# Patient Record
Sex: FEMALE | Race: WHITE | NOT HISPANIC OR LATINO | Employment: FULL TIME | URBAN - METROPOLITAN AREA
[De-identification: names, ages, dates, MRNs, and addresses within clinical notes are randomized per-mention and may not be internally consistent; named-entity substitution may affect disease eponyms.]

---

## 2020-12-22 ENCOUNTER — OFFICE VISIT (OUTPATIENT)
Dept: URGENT CARE | Facility: CLINIC | Age: 25
End: 2020-12-22
Payer: COMMERCIAL

## 2020-12-22 ENCOUNTER — APPOINTMENT (OUTPATIENT)
Dept: RADIOLOGY | Facility: IMAGING CENTER | Age: 25
End: 2020-12-22
Attending: PHYSICIAN ASSISTANT
Payer: COMMERCIAL

## 2020-12-22 VITALS
HEART RATE: 69 BPM | RESPIRATION RATE: 16 BRPM | OXYGEN SATURATION: 97 % | BODY MASS INDEX: 22.49 KG/M2 | SYSTOLIC BLOOD PRESSURE: 110 MMHG | HEIGHT: 65 IN | WEIGHT: 135 LBS | TEMPERATURE: 98.6 F | DIASTOLIC BLOOD PRESSURE: 86 MMHG
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
## 2020-12-22 PROCEDURE — 72220 X-RAY EXAM SACRUM TAILBONE: CPT | Mod: TC,FY | Performed by: PHYSICIAN ASSISTANT

## 2020-12-22 PROCEDURE — 99203 OFFICE O/P NEW LOW 30 MIN: CPT | Performed by: PHYSICIAN ASSISTANT

## 2020-12-22 RX ORDER — FLUCONAZOLE 150 MG/1
TABLET ORAL
COMMUNITY
Start: 2020-12-16

## 2020-12-22 RX ORDER — OMEGA-3S/DHA/EPA/FISH OIL/D3 300MG-1000
400 CAPSULE ORAL DAILY
COMMUNITY

## 2020-12-22 RX ORDER — PRAZOSIN HYDROCHLORIDE 1 MG/1
CAPSULE ORAL
COMMUNITY
Start: 2020-12-08

## 2020-12-22 RX ORDER — KETOROLAC TROMETHAMINE 30 MG/ML
30 INJECTION, SOLUTION INTRAMUSCULAR; INTRAVENOUS ONCE
Status: COMPLETED | OUTPATIENT
Start: 2020-12-22 | End: 2020-12-22

## 2020-12-22 RX ORDER — SPIRONOLACTONE 25 MG/1
25 TABLET ORAL DAILY
COMMUNITY
Start: 2020-06-26

## 2020-12-22 RX ORDER — CLINDAMYCIN PHOSPHATE 20 MG/G
CREAM VAGINAL
COMMUNITY
Start: 2020-12-16

## 2020-12-22 RX ORDER — DEXTROAMPHETAMINE SACCHARATE, AMPHETAMINE ASPARTATE, DEXTROAMPHETAMINE SULFATE AND AMPHETAMINE SULFATE 5; 5; 5; 5 MG/1; MG/1; MG/1; MG/1
TABLET ORAL
COMMUNITY
Start: 2020-11-23

## 2020-12-22 RX ORDER — PROPRANOLOL HYDROCHLORIDE 10 MG/1
TABLET ORAL
COMMUNITY
Start: 2020-12-15

## 2020-12-22 RX ORDER — OMEPRAZOLE 20 MG/1
CAPSULE, DELAYED RELEASE ORAL
COMMUNITY
Start: 2020-12-14

## 2020-12-22 RX ORDER — DROSPIRENONE 4 MG/1
TABLET, FILM COATED ORAL
COMMUNITY
Start: 2020-11-13

## 2020-12-22 RX ORDER — OMEPRAZOLE 20 MG/1
20 CAPSULE, DELAYED RELEASE ORAL DAILY
COMMUNITY
Start: 2020-06-29

## 2020-12-22 RX ORDER — METHYLPREDNISOLONE 4 MG/1
TABLET ORAL
Qty: 21 TAB | Refills: 0 | Status: SHIPPED | OUTPATIENT
Start: 2020-12-22

## 2020-12-22 RX ORDER — TRAZODONE HYDROCHLORIDE 50 MG/1
20 TABLET ORAL
COMMUNITY

## 2020-12-22 RX ORDER — SPIRONOLACTONE 25 MG/1
TABLET ORAL
COMMUNITY
Start: 2020-12-14

## 2020-12-22 RX ADMIN — KETOROLAC TROMETHAMINE 30 MG: 30 INJECTION, SOLUTION INTRAMUSCULAR; INTRAVENOUS at 14:40

## 2020-12-22 SDOH — HEALTH STABILITY: MENTAL HEALTH: HOW OFTEN DO YOU HAVE A DRINK CONTAINING ALCOHOL?: NEVER

## 2020-12-22 ASSESSMENT — ENCOUNTER SYMPTOMS
CHILLS: 0
VOMITING: 0
MYALGIAS: 1
FEVER: 0
ROS SKIN COMMENTS: NO BRUISING
HEADACHES: 0
DIZZINESS: 0
FLANK PAIN: 0
ABDOMINAL PAIN: 0
NAUSEA: 0
DIAPHORESIS: 0
FALLS: 1
BACK PAIN: 1
TINGLING: 0
DIARRHEA: 0
BLOOD IN STOOL: 0
CONSTIPATION: 0

## 2020-12-22 ASSESSMENT — PAIN SCALES - GENERAL: PAINLEVEL: 4=SLIGHT-MODERATE PAIN

## 2020-12-22 NOTE — PROGRESS NOTES
Subjective:   Hien Naqvi is a 25 y.o. female who presents for Buttocks Pain (x last night, coccyx pain )      HPI:  This is a very pleasant 25-year-old female presenting to the clinic with low back and coccyx pain x1 day.  Patient was walking down the stairs carrying a bunch of stuff when she slipped landing on her tailbone.  She noticed immediate pain.  Her pain is described as a sharp pain with occasional radiating pain up her lumbar spine.  Denies any radicular pain down the legs.  Denies any numbness or tingling to lower extremities.  She has been having normal bowel and bladder function.  Denies any saddle paresthesias.  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  She has been using Tylenol with minimal relief.  Pain is made worse with any palpation, sitting or bending.    Review of Systems   Constitutional: Negative for chills, diaphoresis, fever and malaise/fatigue.   Gastrointestinal: Negative for abdominal pain, blood in stool, constipation, diarrhea, nausea and vomiting.   Genitourinary: Negative for dysuria, flank pain, frequency, hematuria and urgency.   Musculoskeletal: Positive for back pain, falls and myalgias.   Skin: Negative for rash.        No bruising    Neurological: Negative for dizziness, tingling and headaches.       Medications:    • amphetamine-dextroamphetamine Tabs  • clindamycin  • fluconazole  • Shannon-Rul Psyllium Seed Husks Caps  • omeprazole  • prazosin Caps  • propranolol Tabs  • Slynd Tabs  • spironolactone Tabs  • terconazole Crea  • traZODone Tabs  • vitamin D3 (cholecalciferol) Tabs    Allergies: Loratadine and Pineapple    Problem List: Hien Naqvi does not have a problem list on file.    Surgical History:  No past surgical history on file.    Past Social Hx: Hien Naqvi  reports that she has never smoked. She has never used smokeless tobacco. She reports previous alcohol use.     Past Family Hx:  Hien Naqvi family history is not on file.  "    Problem list, medications, and allergies reviewed by myself today in Epic.     Objective:     /86 (BP Location: Left arm, Patient Position: Standing, BP Cuff Size: Adult)   Pulse 69   Temp 37 °C (98.6 °F) (Temporal)   Resp 16   Ht 1.651 m (5' 5\")   Wt 61.2 kg (135 lb)   SpO2 97%   BMI 22.47 kg/m²     Physical Exam  Constitutional:       General: She is not in acute distress.     Appearance: Normal appearance. She is not ill-appearing, toxic-appearing or diaphoretic.   HENT:      Head: Normocephalic and atraumatic.   Eyes:      Conjunctiva/sclera: Conjunctivae normal.   Neck:      Musculoskeletal: Normal range of motion. No neck rigidity.   Cardiovascular:      Rate and Rhythm: Normal rate and regular rhythm.      Pulses: Normal pulses.      Heart sounds: Normal heart sounds.   Pulmonary:      Effort: Pulmonary effort is normal.      Breath sounds: Normal breath sounds. No wheezing.   Musculoskeletal:      Comments: Lumbar exam:  Tenderness to palpation over the L5/S1 spine midline.  Bilateral tenderness to palpation over the lumbar paraspinal muscles and gluteal muscles.  Patient has full range of motion of the bilateral lower extremities.  Strength of bilateral lower extremities 5/5.  Sensation intact.   Lymphadenopathy:      Cervical: No cervical adenopathy.   Skin:     Capillary Refill: Capillary refill takes less than 2 seconds.   Neurological:      General: No focal deficit present.      Mental Status: She is alert. Mental status is at baseline.       DX sacrum and coccyx:  FINDINGS:     There is anterior angulation of the coccyx at the sacrococcygeal junction with lucent appearance of the posterior aspect of this junction. This may represent old fracture deformity or congenital angulation.  No well-defined linear lucency is identified to suggest acute fracture on these plain films.  No blastic or lytic lesions.  The sacral neural arches are intact.     IMPRESSION:     1.  Possible old fracture " or developmental abnormality of the sacrococcygeal junction as described above.     2.  No acute sacral or coccygeal fracture identified on these plain films.     3.  Conservative therapy is recommended. Short-term follow-up plain films could be performed in 7-10 days or CT imaging could be performed for further evaluation.    Assessment/Plan:     Diagnosis and associated orders:   1. Fall, initial encounter    - DX-SACRUM AND COCCYX 2+; Future  - ketorolac (TORADOL) injection 30 mg  - methylPREDNISolone (MEDROL DOSEPAK) 4 MG Tablet Therapy Pack; Follow schedule on package instructions.  Dispense: 21 Tab; Refill: 0     Comments/MDM:     • X-ray was preformed in clinic.  No acute fracture was identified.  Recommended following up with her PCP in [place] for repeat imaging if still symptomatic in the next 7 to 10 days.  • 30 mg IM Toradol provided in clinic.  • May take Medrol Dosepak as prescribed.  Take with food.  • Recommended alternating Tylenol and ibuprofen as needed for pain.  Do not take NSAIDs for the next 24 hours.  • Red flag symptoms and need for emergent follow-up discussed.  Patient understood and agreed to this plan of care.           Differential diagnosis, natural history, supportive care, and indications for immediate follow-up discussed.    Advised the patient to follow-up with the primary care physician for recheck, reevaluation, and consideration of further management.    Please note that this dictation was created using voice recognition software. I have made reasonable attempt to correct obvious errors, but I expect that there are errors of grammar and possibly content that I did not discover before finalizing the note.    This note was electronically signed by MEG Romero PA-C